# Patient Record
(demographics unavailable — no encounter records)

---

## 2024-11-29 NOTE — PLAN
[FreeTextEntry1] : [employer] will send urine cx pt has breast imaging and follow up with her breast surgeon soon n/v 1 yr or PRN

## 2024-11-29 NOTE — PHYSICAL EXAM
[Chaperone Present] : A chaperone was present in the examining room during all aspects of the physical examination [[id number]] : A chaperone was present during the pelvic exam. [Soft] : soft [Non-tender] : non-tender [Non-distended] : non-distended [No Mass] : no mass [Oriented x3] : oriented x3 [Examination Of The Breasts] : a normal appearance [No Masses] : no breast masses were palpable [Vulvar Atrophy] : vulvar atrophy [Labia Majora] : normal [Labia Minora] : normal [Atrophy] : atrophy [Normal] : normal [Uterine Adnexae] : normal

## 2024-11-29 NOTE — HISTORY OF PRESENT ILLNESS
[FreeTextEntry1] : 64 yo here for annual last GYN exam 4-5 yrs ago had breast issue, needed lumpectomy, pt was unable to tell me if DCIS or higher, did have brief course of RT clear now for almost 5 yrs,  has had several UTIs in the past yr concerned she is having urinary frequency now on antihypertensive NKDA no hx of abnormal paps